# Patient Record
Sex: MALE | Race: WHITE | Employment: FULL TIME | URBAN - NONMETROPOLITAN AREA
[De-identification: names, ages, dates, MRNs, and addresses within clinical notes are randomized per-mention and may not be internally consistent; named-entity substitution may affect disease eponyms.]

---

## 2017-06-05 ENCOUNTER — OFFICE VISIT (OUTPATIENT)
Dept: UROLOGY | Age: 62
End: 2017-06-05

## 2017-06-05 VITALS — WEIGHT: 162 LBS | BODY MASS INDEX: 24.55 KG/M2 | HEIGHT: 68 IN

## 2017-06-05 DIAGNOSIS — R39.12 WEAK URINARY STREAM: Primary | ICD-10-CM

## 2017-06-05 LAB
BILIRUBIN URINE: NEGATIVE
BLOOD URINE, POC: NORMAL
CHARACTER, URINE: CLEAR
COLOR, URINE: YELLOW
GLUCOSE URINE: NEGATIVE MG/DL
KETONES, URINE: NEGATIVE
LEUKOCYTE CLUMPS, URINE: NEGATIVE
NITRITE, URINE: NEGATIVE
PH, URINE: 7
POST VOID RESIDUAL (PVR): 98 ML
PROTEIN, URINE: NEGATIVE MG/DL
SPECIFIC GRAVITY, URINE: 1.02 (ref 1–1.03)
UROBILINOGEN, URINE: 0.2 EU/DL

## 2017-06-05 PROCEDURE — 81003 URINALYSIS AUTO W/O SCOPE: CPT | Performed by: UROLOGY

## 2017-06-05 PROCEDURE — 51741 ELECTRO-UROFLOWMETRY FIRST: CPT | Performed by: UROLOGY

## 2017-06-05 PROCEDURE — 51798 US URINE CAPACITY MEASURE: CPT | Performed by: UROLOGY

## 2017-06-05 PROCEDURE — 99214 OFFICE O/P EST MOD 30 MIN: CPT | Performed by: UROLOGY

## 2017-06-05 RX ORDER — TAMSULOSIN HYDROCHLORIDE 0.4 MG/1
0.4 CAPSULE ORAL NIGHTLY
Qty: 90 CAPSULE | Refills: 3 | Status: SHIPPED | OUTPATIENT
Start: 2017-06-05 | End: 2018-05-09 | Stop reason: SDUPTHER

## 2017-06-05 ASSESSMENT — ENCOUNTER SYMPTOMS
VOMITING: 0
CHEST TIGHTNESS: 0
SHORTNESS OF BREATH: 0
ABDOMINAL PAIN: 0
COLOR CHANGE: 0
EYE PAIN: 0
EYE REDNESS: 0
FACIAL SWELLING: 0

## 2018-05-11 RX ORDER — TAMSULOSIN HYDROCHLORIDE 0.4 MG/1
CAPSULE ORAL
Qty: 90 CAPSULE | Refills: 3 | Status: SHIPPED | OUTPATIENT
Start: 2018-05-11 | End: 2019-06-17 | Stop reason: SDUPTHER

## 2018-06-18 ENCOUNTER — OFFICE VISIT (OUTPATIENT)
Dept: UROLOGY | Age: 63
End: 2018-06-18
Payer: COMMERCIAL

## 2018-06-18 VITALS
WEIGHT: 158 LBS | DIASTOLIC BLOOD PRESSURE: 82 MMHG | HEIGHT: 68 IN | SYSTOLIC BLOOD PRESSURE: 136 MMHG | BODY MASS INDEX: 23.95 KG/M2

## 2018-06-18 DIAGNOSIS — N40.0 BENIGN PROSTATIC HYPERPLASIA, UNSPECIFIED WHETHER LOWER URINARY TRACT SYMPTOMS PRESENT: Primary | ICD-10-CM

## 2018-06-18 LAB
BILIRUBIN URINE: NEGATIVE
BLOOD URINE, POC: NEGATIVE
CHARACTER, URINE: CLEAR
COLOR, URINE: YELLOW
GLUCOSE URINE: NEGATIVE MG/DL
KETONES, URINE: ABNORMAL
LEUKOCYTE CLUMPS, URINE: NEGATIVE
NITRITE, URINE: NEGATIVE
PH, URINE: 6
POST VOID RESIDUAL (PVR): 0 ML
PROTEIN, URINE: NEGATIVE MG/DL
SPECIFIC GRAVITY, URINE: 1.02 (ref 1–1.03)
UROBILINOGEN, URINE: 0.2 EU/DL

## 2018-06-18 PROCEDURE — 51798 US URINE CAPACITY MEASURE: CPT | Performed by: UROLOGY

## 2018-06-18 PROCEDURE — 81003 URINALYSIS AUTO W/O SCOPE: CPT | Performed by: UROLOGY

## 2018-06-18 PROCEDURE — 99214 OFFICE O/P EST MOD 30 MIN: CPT | Performed by: UROLOGY

## 2018-06-18 ASSESSMENT — ENCOUNTER SYMPTOMS
SHORTNESS OF BREATH: 0
DIARRHEA: 0
CHEST TIGHTNESS: 0
CONSTIPATION: 0

## 2019-06-15 LAB — PROSTATE SPECIFIC ANTIGEN: 0.5 NG/ML

## 2019-06-17 ENCOUNTER — OFFICE VISIT (OUTPATIENT)
Dept: UROLOGY | Age: 64
End: 2019-06-17
Payer: COMMERCIAL

## 2019-06-17 VITALS
HEIGHT: 68 IN | SYSTOLIC BLOOD PRESSURE: 120 MMHG | BODY MASS INDEX: 25.31 KG/M2 | WEIGHT: 167 LBS | DIASTOLIC BLOOD PRESSURE: 72 MMHG

## 2019-06-17 DIAGNOSIS — N40.0 BENIGN PROSTATIC HYPERPLASIA, UNSPECIFIED WHETHER LOWER URINARY TRACT SYMPTOMS PRESENT: Primary | ICD-10-CM

## 2019-06-17 PROCEDURE — 99213 OFFICE O/P EST LOW 20 MIN: CPT | Performed by: NURSE PRACTITIONER

## 2019-06-17 RX ORDER — TAMSULOSIN HYDROCHLORIDE 0.4 MG/1
CAPSULE ORAL
Qty: 90 CAPSULE | Refills: 3 | Status: SHIPPED | OUTPATIENT
Start: 2019-06-17

## 2019-06-17 NOTE — PROGRESS NOTES
[address]  [address] [facility]  Dept: [phone]  Loc: [phone]  Visit Date: 6/17/2019      HPI:     Fred Bonilla f/u today for BPH. He is doing well, has no complaints at this time. He remains on Flomax, tolerating well with no side effects  Trend of PSA:  0.50 06/2019  0.37 05/2016  He comes in today by himself. Hx is obtained from the patient and medical record. Current Outpatient Medications   Medication Sig Dispense Refill    IRON PO Take by mouth      Flaxseed, Linseed, (FLAXSEED OIL PO) Take by mouth      tamsulosin (FLOMAX) 0.4 MG capsule TAKE ONE (1) CAPSULE BY MOUTH NIGHTLY 90 capsule 3    POTASSIUM PO Take by mouth      celecoxib (CELEBREX) 200 MG capsule Take 200 mg by mouth daily      MULTIPLE VITAMIN PO Take by mouth daily      Cholecalciferol (VITAMIN D3) 3000 UNITS TABS Take 2,000 Units by mouth 2 times daily      MAGNESIUM PO Take by mouth daily      esomeprazole Magnesium (NEXIUM) 40 MG PACK Take 40 mg by mouth daily. No current facility-administered medications for this visit. Past Medical History  Ira Hernandez  has a past medical history of Chronic back pain and GERD (gastroesophageal reflux disease). Past Surgical History  The patient  has a past surgical history that includes Appendectomy. Family History  This patient's family history is not on file. Social History  Ira Hernandez  reports that he has never smoked. He has never used smokeless tobacco. He reports that he does not drink alcohol or use drugs. Subjective:     Review of Systems  No problems with ears, nose or throat. No problems with eyes. No chest pain, shortness of breath, abdominal pain, extremity pain or weakness, and no neurological deficits. No rashes.  symptoms per HPI. The remainder of the review of symptoms is negative.     Objective:     PE:   Vitals:    06/17/19 1605   BP: 120/72   Weight: 167 lb (75.8 kg)   Height: 5' 8\" (1.727 m)       Constitutional: Alert and oriented times 3, no acute distress and cooperative to examination with appropriate mood and affect. HENT:   Head:   Normocephalic and atraumatic. Mouth/Throat:   Mucous membranes are normal.   EOM are normal. No scleral icterus. PERRLA. Neck:   Supple, symmetrical, trachea midline  Pulmonary/Chest:  Chest symmetric with normal A/P diameter,  Normal respiratory rate and rhthym. No use of accessory muscles. Abdominal:   Soft. No tenderness. Bowel sounds present. Musculoskeletal:  Normal range of motion. No edema or tenderness of lower extremities. Extremities: No cyanosis, clubbing, or edema present. Neurological:   Alert and oriented. No cranial nerve deficit. [facility]:   Normal mood and affect. LOYD: patient deferred     Labs   Urine dip demonstrates   No results found for this visit on 06/17/19. Patients recent PSA values are as follows  Lab Results   Component Value Date    PSA 0.50 06/15/2019    PSA 0.37 05/20/2016        Recent BUN/Creatinine:  No results found for: BUN, CREATININE    Radiology  No recent imaging       Assessment & Plan:     BPH w/ LUTS    Kennedy f/u today for BPH. His LUTS are stable, remains on Flomax 0.4 mg, tolerating well. PSA is stable at 0.50, follow PSA in 1 year     Return in about 1 year (around 6/17/2020) for BPH.     RAMIRO Mauricio  Urology